# Patient Record
Sex: MALE | Race: WHITE | NOT HISPANIC OR LATINO
[De-identification: names, ages, dates, MRNs, and addresses within clinical notes are randomized per-mention and may not be internally consistent; named-entity substitution may affect disease eponyms.]

---

## 2017-01-24 PROBLEM — Z00.00 ENCOUNTER FOR PREVENTIVE HEALTH EXAMINATION: Status: ACTIVE | Noted: 2017-01-24

## 2017-02-02 ENCOUNTER — APPOINTMENT (OUTPATIENT)
Dept: OTOLARYNGOLOGY | Facility: CLINIC | Age: 67
End: 2017-02-02

## 2017-02-02 VITALS
HEART RATE: 83 BPM | HEIGHT: 72 IN | OXYGEN SATURATION: 95 % | WEIGHT: 195 LBS | DIASTOLIC BLOOD PRESSURE: 95 MMHG | BODY MASS INDEX: 26.41 KG/M2 | TEMPERATURE: 97.8 F | SYSTOLIC BLOOD PRESSURE: 145 MMHG

## 2017-02-02 DIAGNOSIS — H93.19 TINNITUS, UNSPECIFIED EAR: ICD-10-CM

## 2017-02-02 DIAGNOSIS — Z78.9 OTHER SPECIFIED HEALTH STATUS: ICD-10-CM

## 2017-02-02 DIAGNOSIS — F15.90 OTHER STIMULANT USE, UNSPECIFIED, UNCOMPLICATED: ICD-10-CM

## 2017-02-02 RX ORDER — IBUPROFEN 800 MG/1
TABLET, FILM COATED ORAL
Refills: 0 | Status: DISCONTINUED | COMMUNITY

## 2017-02-02 RX ORDER — ATORVASTATIN CALCIUM 10 MG/1
10 TABLET, FILM COATED ORAL
Refills: 0 | Status: DISCONTINUED | COMMUNITY

## 2017-02-16 ENCOUNTER — APPOINTMENT (OUTPATIENT)
Dept: OTOLARYNGOLOGY | Facility: CLINIC | Age: 67
End: 2017-02-16

## 2017-03-29 ENCOUNTER — APPOINTMENT (OUTPATIENT)
Dept: PHARMACY | Facility: CLINIC | Age: 67
End: 2017-03-29

## 2017-03-30 ENCOUNTER — APPOINTMENT (OUTPATIENT)
Dept: OTOLARYNGOLOGY | Facility: CLINIC | Age: 67
End: 2017-03-30

## 2017-03-30 DIAGNOSIS — G47.33 OBSTRUCTIVE SLEEP APNEA (ADULT) (PEDIATRIC): ICD-10-CM

## 2017-04-21 ENCOUNTER — MEDICATION RENEWAL (OUTPATIENT)
Age: 67
End: 2017-04-21

## 2017-12-15 ENCOUNTER — APPOINTMENT (OUTPATIENT)
Dept: OTOLARYNGOLOGY | Facility: CLINIC | Age: 67
End: 2017-12-15
Payer: COMMERCIAL

## 2017-12-15 VITALS
SYSTOLIC BLOOD PRESSURE: 151 MMHG | HEART RATE: 97 BPM | OXYGEN SATURATION: 96 % | TEMPERATURE: 98.2 F | DIASTOLIC BLOOD PRESSURE: 97 MMHG

## 2017-12-15 DIAGNOSIS — H93.12 TINNITUS, LEFT EAR: ICD-10-CM

## 2017-12-15 DIAGNOSIS — H90.11 CONDUCTIVE HEARING LOSS, UNILATERAL, RIGHT EAR, WITH UNRESTRICTED HEARING ON THE CONTRALATERAL SIDE: ICD-10-CM

## 2017-12-15 PROCEDURE — 92550 TYMPANOMETRY & REFLEX THRESH: CPT

## 2017-12-15 PROCEDURE — 92557 COMPREHENSIVE HEARING TEST: CPT

## 2017-12-15 PROCEDURE — 99214 OFFICE O/P EST MOD 30 MIN: CPT

## 2017-12-15 RX ORDER — CARBIDOPA AND LEVODOPA 25; 100 MG/1; MG/1
25-100 TABLET ORAL
Qty: 270 | Refills: 0 | Status: ACTIVE | COMMUNITY
Start: 2017-12-08

## 2017-12-15 RX ORDER — LOSARTAN POTASSIUM 25 MG/1
25 TABLET, FILM COATED ORAL
Qty: 90 | Refills: 0 | Status: ACTIVE | COMMUNITY
Start: 2017-10-23

## 2017-12-15 RX ORDER — SELEGILINE HYDROCHLORIDE 5 MG/1
5 CAPSULE ORAL
Qty: 60 | Refills: 0 | Status: ACTIVE | COMMUNITY
Start: 2017-11-20

## 2018-01-01 ENCOUNTER — EMERGENCY (EMERGENCY)
Facility: HOSPITAL | Age: 68
LOS: 1 days | Discharge: HOME | End: 2018-01-01
Admitting: EMERGENCY MEDICINE
Payer: COMMERCIAL

## 2018-01-01 ENCOUNTER — EMERGENCY (EMERGENCY)
Facility: HOSPITAL | Age: 68
LOS: 1 days | Discharge: HOME | End: 2018-01-01
Attending: EMERGENCY MEDICINE | Admitting: EMERGENCY MEDICINE
Payer: COMMERCIAL

## 2018-01-01 ENCOUNTER — APPOINTMENT (OUTPATIENT)
Dept: PHARMACY | Facility: CLINIC | Age: 68
End: 2018-01-01
Payer: SELF-PAY

## 2018-01-01 VITALS
SYSTOLIC BLOOD PRESSURE: 148 MMHG | OXYGEN SATURATION: 96 % | HEART RATE: 95 BPM | DIASTOLIC BLOOD PRESSURE: 89 MMHG | RESPIRATION RATE: 16 BRPM | TEMPERATURE: 98 F

## 2018-01-01 VITALS
DIASTOLIC BLOOD PRESSURE: 90 MMHG | TEMPERATURE: 98 F | HEART RATE: 91 BPM | SYSTOLIC BLOOD PRESSURE: 149 MMHG | OXYGEN SATURATION: 100 % | RESPIRATION RATE: 15 BRPM

## 2018-01-01 VITALS
OXYGEN SATURATION: 98 % | HEART RATE: 91 BPM | SYSTOLIC BLOOD PRESSURE: 155 MMHG | DIASTOLIC BLOOD PRESSURE: 97 MMHG | TEMPERATURE: 98 F | RESPIRATION RATE: 16 BRPM

## 2018-01-01 VITALS
TEMPERATURE: 98 F | DIASTOLIC BLOOD PRESSURE: 92 MMHG | OXYGEN SATURATION: 97 % | RESPIRATION RATE: 17 BRPM | SYSTOLIC BLOOD PRESSURE: 159 MMHG | HEART RATE: 96 BPM

## 2018-01-01 VITALS
RESPIRATION RATE: 18 BRPM | TEMPERATURE: 98 F | DIASTOLIC BLOOD PRESSURE: 86 MMHG | OXYGEN SATURATION: 97 % | HEART RATE: 79 BPM | SYSTOLIC BLOOD PRESSURE: 133 MMHG

## 2018-01-01 VITALS
OXYGEN SATURATION: 99 % | RESPIRATION RATE: 17 BRPM | DIASTOLIC BLOOD PRESSURE: 85 MMHG | HEART RATE: 94 BPM | SYSTOLIC BLOOD PRESSURE: 150 MMHG | TEMPERATURE: 97 F

## 2018-01-01 VITALS
OXYGEN SATURATION: 96 % | TEMPERATURE: 99 F | RESPIRATION RATE: 18 BRPM | DIASTOLIC BLOOD PRESSURE: 93 MMHG | HEART RATE: 89 BPM | SYSTOLIC BLOOD PRESSURE: 148 MMHG

## 2018-01-01 VITALS
RESPIRATION RATE: 16 BRPM | HEART RATE: 98 BPM | SYSTOLIC BLOOD PRESSURE: 166 MMHG | TEMPERATURE: 98 F | OXYGEN SATURATION: 98 % | DIASTOLIC BLOOD PRESSURE: 103 MMHG

## 2018-01-01 DIAGNOSIS — I10 ESSENTIAL (PRIMARY) HYPERTENSION: ICD-10-CM

## 2018-01-01 DIAGNOSIS — S06.6X0A TRAUMATIC SUBARACHNOID HEMORRHAGE WITHOUT LOSS OF CONSCIOUSNESS, INITIAL ENCOUNTER: ICD-10-CM

## 2018-01-01 DIAGNOSIS — S01.81XA LACERATION WITHOUT FOREIGN BODY OF OTHER PART OF HEAD, INITIAL ENCOUNTER: ICD-10-CM
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
## 2018-01-01 DIAGNOSIS — R33.9 RETENTION OF URINE, UNSPECIFIED: ICD-10-CM

## 2018-01-01 DIAGNOSIS — Z79.899 OTHER LONG TERM (CURRENT) DRUG THERAPY: ICD-10-CM

## 2018-01-01 DIAGNOSIS — R29.6 REPEATED FALLS: ICD-10-CM

## 2018-01-01 DIAGNOSIS — R41.82 ALTERED MENTAL STATUS, UNSPECIFIED: ICD-10-CM

## 2018-01-01 DIAGNOSIS — E78.5 HYPERLIPIDEMIA, UNSPECIFIED: ICD-10-CM
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
## 2018-01-01 LAB
ALBUMIN SERPL ELPH-MCNC: 2.6 G/DL — LOW (ref 3.4–5)
ALBUMIN SERPL ELPH-MCNC: 3.2 G/DL — LOW (ref 3.4–5)
ALP SERPL-CCNC: 81 U/L — SIGNIFICANT CHANGE UP (ref 40–120)
ALP SERPL-CCNC: 93 U/L — SIGNIFICANT CHANGE UP (ref 40–120)
ALT FLD-CCNC: 10 U/L — LOW (ref 12–42)
ALT FLD-CCNC: 32 U/L — SIGNIFICANT CHANGE UP (ref 12–42)
ANION GAP SERPL CALC-SCNC: 13 MMOL/L — SIGNIFICANT CHANGE UP (ref 5–17)
ANION GAP SERPL CALC-SCNC: 8 MMOL/L — LOW (ref 9–16)
ANION GAP SERPL CALC-SCNC: 8 MMOL/L — LOW (ref 9–16)
APPEARANCE UR: CLEAR — SIGNIFICANT CHANGE UP
APTT BLD: 30.4 SEC — SIGNIFICANT CHANGE UP (ref 27.5–36.3)
AST SERPL-CCNC: 30 U/L — SIGNIFICANT CHANGE UP (ref 15–37)
AST SERPL-CCNC: 35 U/L — SIGNIFICANT CHANGE UP (ref 15–37)
BASOPHILS NFR BLD AUTO: 0.5 % — SIGNIFICANT CHANGE UP (ref 0–2)
BASOPHILS NFR BLD AUTO: 0.5 % — SIGNIFICANT CHANGE UP (ref 0–2)
BILIRUB SERPL-MCNC: 0.3 MG/DL — SIGNIFICANT CHANGE UP (ref 0.2–1.2)
BILIRUB SERPL-MCNC: 0.4 MG/DL — SIGNIFICANT CHANGE UP (ref 0.2–1.2)
BILIRUB UR-MCNC: NEGATIVE — SIGNIFICANT CHANGE UP
BUN SERPL-MCNC: 24 MG/DL — HIGH (ref 7–23)
BUN SERPL-MCNC: 25 MG/DL — HIGH (ref 7–23)
BUN SERPL-MCNC: 35 MG/DL — HIGH (ref 7–23)
CALCIUM SERPL-MCNC: 8.6 MG/DL — SIGNIFICANT CHANGE UP (ref 8.5–10.5)
CALCIUM SERPL-MCNC: 8.9 MG/DL — SIGNIFICANT CHANGE UP (ref 8.5–10.5)
CALCIUM SERPL-MCNC: 9 MG/DL — SIGNIFICANT CHANGE UP (ref 8.4–10.5)
CHLORIDE SERPL-SCNC: 101 MMOL/L — SIGNIFICANT CHANGE UP (ref 96–108)
CHLORIDE SERPL-SCNC: 103 MMOL/L — SIGNIFICANT CHANGE UP (ref 96–108)
CHLORIDE SERPL-SCNC: 104 MMOL/L — SIGNIFICANT CHANGE UP (ref 96–108)
CO2 SERPL-SCNC: 26 MMOL/L — SIGNIFICANT CHANGE UP (ref 22–31)
CO2 SERPL-SCNC: 27 MMOL/L — SIGNIFICANT CHANGE UP (ref 22–31)
CO2 SERPL-SCNC: 27 MMOL/L — SIGNIFICANT CHANGE UP (ref 22–31)
COLOR SPEC: YELLOW — SIGNIFICANT CHANGE UP
CREAT SERPL-MCNC: 0.8 MG/DL — SIGNIFICANT CHANGE UP (ref 0.5–1.3)
CREAT SERPL-MCNC: 0.82 MG/DL — SIGNIFICANT CHANGE UP (ref 0.5–1.3)
CREAT SERPL-MCNC: 0.98 MG/DL — SIGNIFICANT CHANGE UP (ref 0.5–1.3)
DIFF PNL FLD: ABNORMAL
EOSINOPHIL NFR BLD AUTO: 0.7 % — SIGNIFICANT CHANGE UP (ref 0–6)
EOSINOPHIL NFR BLD AUTO: 1.8 % — SIGNIFICANT CHANGE UP (ref 0–6)
EXTRA BLUE TOP TUBE: SIGNIFICANT CHANGE UP
GLUCOSE SERPL-MCNC: 107 MG/DL — HIGH (ref 70–99)
GLUCOSE SERPL-MCNC: 109 MG/DL — HIGH (ref 70–99)
GLUCOSE SERPL-MCNC: 137 MG/DL — HIGH (ref 70–99)
GLUCOSE UR QL: NEGATIVE — SIGNIFICANT CHANGE UP
HCT VFR BLD CALC: 37.1 % — LOW (ref 39–50)
HCT VFR BLD CALC: 39.4 % — SIGNIFICANT CHANGE UP (ref 39–50)
HCT VFR BLD CALC: 39.4 % — SIGNIFICANT CHANGE UP (ref 39–50)
HGB BLD-MCNC: 12.2 G/DL — LOW (ref 13–17)
HGB BLD-MCNC: 12.8 G/DL — LOW (ref 13–17)
HGB BLD-MCNC: 13.1 G/DL — SIGNIFICANT CHANGE UP (ref 13–17)
IMM GRANULOCYTES NFR BLD AUTO: 1.5 % — SIGNIFICANT CHANGE UP (ref 0–1.5)
INR BLD: 1.11 — SIGNIFICANT CHANGE UP (ref 0.88–1.16)
KETONES UR-MCNC: NEGATIVE — SIGNIFICANT CHANGE UP
LEUKOCYTE ESTERASE UR-ACNC: ABNORMAL
LYMPHOCYTES # BLD AUTO: 13.6 % — SIGNIFICANT CHANGE UP (ref 13–44)
LYMPHOCYTES # BLD AUTO: 19.6 % — SIGNIFICANT CHANGE UP (ref 13–44)
MCHC RBC-ENTMCNC: 30.3 PG — SIGNIFICANT CHANGE UP (ref 27–34)
MCHC RBC-ENTMCNC: 30.5 PG — SIGNIFICANT CHANGE UP (ref 27–34)
MCHC RBC-ENTMCNC: 31 PG — SIGNIFICANT CHANGE UP (ref 27–34)
MCHC RBC-ENTMCNC: 32.5 G/DL — SIGNIFICANT CHANGE UP (ref 32–36)
MCHC RBC-ENTMCNC: 32.9 G/DL — SIGNIFICANT CHANGE UP (ref 32–36)
MCHC RBC-ENTMCNC: 33.2 G/DL — SIGNIFICANT CHANGE UP (ref 32–36)
MCV RBC AUTO: 91.6 FL — SIGNIFICANT CHANGE UP (ref 80–100)
MCV RBC AUTO: 93.1 FL — SIGNIFICANT CHANGE UP (ref 80–100)
MCV RBC AUTO: 94.4 FL — SIGNIFICANT CHANGE UP (ref 80–100)
MONOCYTES NFR BLD AUTO: 7.6 % — SIGNIFICANT CHANGE UP (ref 2–14)
MONOCYTES NFR BLD AUTO: 7.6 % — SIGNIFICANT CHANGE UP (ref 2–14)
NEUTROPHILS NFR BLD AUTO: 70.5 % — SIGNIFICANT CHANGE UP (ref 43–77)
NEUTROPHILS NFR BLD AUTO: 76.1 % — SIGNIFICANT CHANGE UP (ref 43–77)
NITRITE UR-MCNC: NEGATIVE — SIGNIFICANT CHANGE UP
PH UR: 6 — SIGNIFICANT CHANGE UP (ref 5–8)
PLATELET # BLD AUTO: 306 K/UL — SIGNIFICANT CHANGE UP (ref 150–400)
PLATELET # BLD AUTO: 336 K/UL — SIGNIFICANT CHANGE UP (ref 150–400)
PLATELET # BLD AUTO: 365 K/UL — SIGNIFICANT CHANGE UP (ref 150–400)
POTASSIUM SERPL-MCNC: 3.7 MMOL/L — SIGNIFICANT CHANGE UP (ref 3.5–5.3)
POTASSIUM SERPL-MCNC: 3.8 MMOL/L — SIGNIFICANT CHANGE UP (ref 3.5–5.3)
POTASSIUM SERPL-MCNC: 4 MMOL/L — SIGNIFICANT CHANGE UP (ref 3.5–5.3)
POTASSIUM SERPL-SCNC: 3.7 MMOL/L — SIGNIFICANT CHANGE UP (ref 3.5–5.3)
POTASSIUM SERPL-SCNC: 3.8 MMOL/L — SIGNIFICANT CHANGE UP (ref 3.5–5.3)
POTASSIUM SERPL-SCNC: 4 MMOL/L — SIGNIFICANT CHANGE UP (ref 3.5–5.3)
PROT SERPL-MCNC: 6.5 G/DL — SIGNIFICANT CHANGE UP (ref 6.4–8.2)
PROT SERPL-MCNC: 6.9 G/DL — SIGNIFICANT CHANGE UP (ref 6.4–8.2)
PROT UR-MCNC: ABNORMAL MG/DL
PROTHROM AB SERPL-ACNC: 12.4 SEC — SIGNIFICANT CHANGE UP (ref 10–12.9)
RBC # BLD: 3.93 M/UL — LOW (ref 4.2–5.8)
RBC # BLD: 4.23 M/UL — SIGNIFICANT CHANGE UP (ref 4.2–5.8)
RBC # BLD: 4.3 M/UL — SIGNIFICANT CHANGE UP (ref 4.2–5.8)
RBC # FLD: 12.1 % — SIGNIFICANT CHANGE UP (ref 10.3–14.5)
RBC # FLD: 13.4 % — SIGNIFICANT CHANGE UP (ref 10.3–14.5)
RBC # FLD: 13.7 % — SIGNIFICANT CHANGE UP (ref 10.3–16.9)
SODIUM SERPL-SCNC: 137 MMOL/L — SIGNIFICANT CHANGE UP (ref 132–145)
SODIUM SERPL-SCNC: 139 MMOL/L — SIGNIFICANT CHANGE UP (ref 132–145)
SODIUM SERPL-SCNC: 141 MMOL/L — SIGNIFICANT CHANGE UP (ref 135–145)
SP GR SPEC: 1.01 — SIGNIFICANT CHANGE UP (ref 1–1.03)
UROBILINOGEN FLD QL: 0.2 E.U./DL — SIGNIFICANT CHANGE UP
WBC # BLD: 11.1 K/UL — HIGH (ref 3.8–10.5)
WBC # BLD: 8.2 K/UL — SIGNIFICANT CHANGE UP (ref 3.8–10.5)
WBC # BLD: 9.9 K/UL — SIGNIFICANT CHANGE UP (ref 3.8–10.5)
WBC # FLD AUTO: 11.1 K/UL — HIGH (ref 3.8–10.5)
WBC # FLD AUTO: 8.2 K/UL — SIGNIFICANT CHANGE UP (ref 3.8–10.5)
WBC # FLD AUTO: 9.9 K/UL — SIGNIFICANT CHANGE UP (ref 3.8–10.5)

## 2018-01-01 PROCEDURE — 70450 CT HEAD/BRAIN W/O DYE: CPT

## 2018-01-01 PROCEDURE — V5299A: CUSTOM | Mod: NC

## 2018-01-01 PROCEDURE — 93005 ELECTROCARDIOGRAM TRACING: CPT

## 2018-01-01 PROCEDURE — 99284 EMERGENCY DEPT VISIT MOD MDM: CPT | Mod: 25

## 2018-01-01 PROCEDURE — 93010 ELECTROCARDIOGRAM REPORT: CPT

## 2018-01-01 PROCEDURE — 36415 COLL VENOUS BLD VENIPUNCTURE: CPT

## 2018-01-01 PROCEDURE — 72125 CT NECK SPINE W/O DYE: CPT | Mod: 26

## 2018-01-01 PROCEDURE — V5299D: CUSTOM

## 2018-01-01 PROCEDURE — 70450 CT HEAD/BRAIN W/O DYE: CPT | Mod: 26

## 2018-01-01 PROCEDURE — 97161 PT EVAL LOW COMPLEX 20 MIN: CPT

## 2018-01-01 PROCEDURE — 93010 ELECTROCARDIOGRAM REPORT: CPT | Mod: 59

## 2018-01-01 PROCEDURE — 73523 X-RAY EXAM HIPS BI 5/> VIEWS: CPT

## 2018-01-01 PROCEDURE — 99284 EMERGENCY DEPT VISIT MOD MDM: CPT

## 2018-01-01 PROCEDURE — 80048 BASIC METABOLIC PNL TOTAL CA: CPT

## 2018-01-01 PROCEDURE — 71045 X-RAY EXAM CHEST 1 VIEW: CPT | Mod: 26

## 2018-01-01 PROCEDURE — 99285 EMERGENCY DEPT VISIT HI MDM: CPT | Mod: 25

## 2018-01-01 PROCEDURE — 12011 RPR F/E/E/N/L/M 2.5 CM/<: CPT

## 2018-01-01 PROCEDURE — 85025 COMPLETE CBC W/AUTO DIFF WBC: CPT

## 2018-01-01 PROCEDURE — 73523 X-RAY EXAM HIPS BI 5/> VIEWS: CPT | Mod: 26

## 2018-01-01 RX ORDER — CARBIDOPA AND LEVODOPA 25; 100 MG/1; MG/1
1 TABLET ORAL ONCE
Qty: 0 | Refills: 0 | Status: COMPLETED | OUTPATIENT
Start: 2018-01-01 | End: 2018-01-01

## 2018-01-01 RX ORDER — BACITRACIN ZINC 500 UNIT/G
1 OINTMENT IN PACKET (EA) TOPICAL ONCE
Qty: 0 | Refills: 0 | Status: COMPLETED | OUTPATIENT
Start: 2018-01-01 | End: 2018-01-01

## 2018-01-01 RX ADMIN — CARBIDOPA AND LEVODOPA 1 TABLET(S): 25; 100 TABLET ORAL at 23:59

## 2018-01-01 RX ADMIN — Medication 1 APPLICATION(S): at 03:26

## 2018-03-06 ENCOUNTER — APPOINTMENT (OUTPATIENT)
Dept: PHARMACY | Facility: CLINIC | Age: 68
End: 2018-03-06
Payer: SELF-PAY

## 2018-03-06 PROCEDURE — V5010 ASSESSMENT FOR HEARING AID: CPT

## 2018-03-19 ENCOUNTER — APPOINTMENT (OUTPATIENT)
Dept: PHARMACY | Facility: CLINIC | Age: 68
End: 2018-03-19
Payer: SELF-PAY

## 2018-03-19 PROCEDURE — V5261E: CUSTOM

## 2018-03-28 ENCOUNTER — APPOINTMENT (OUTPATIENT)
Dept: PHARMACY | Facility: CLINIC | Age: 68
End: 2018-03-28
Payer: SELF-PAY

## 2018-03-28 PROCEDURE — V5299A: CUSTOM | Mod: NC

## 2018-09-18 NOTE — ED PROVIDER NOTE - NEUROLOGICAL, MLM
Alert and oriented, no focal deficits, no motor or sensory deficits.  Intermittent RUE pill rolling movements.

## 2018-09-18 NOTE — ED ADULT NURSE NOTE - CHIEF COMPLAINT QUOTE
pt had archer placed yesterday by [facility]  urology. pt had renal sono at 1pm today, had bag emptied there and bag has not drained since. now with distention and pain

## 2018-09-18 NOTE — ED ADULT TRIAGE NOTE - CHIEF COMPLAINT QUOTE
pt had archer placed yesterday by [facility]  urology. pt had renal sono at 1pm today, had bag emptied there and bag has not drained since. now with distention and pain

## 2018-09-18 NOTE — ED ADULT NURSE NOTE - OBJECTIVE STATEMENT
Pt is a 68y male complaining of urinary catheter complications. Pt had catheter placed yesterday morning by urology yesterday. As per patients wife pt went to for renal ultrasound today and drainage bag was emptied there at about 1pm. Drainage bag has not filled since that time. Pts bladder is distended. Pt denies pain at this time. Will  continue to monitor.

## 2018-09-18 NOTE — ED ADULT NURSE REASSESSMENT NOTE - NS ED NURSE REASSESS COMMENT FT1
Pt urinary catheter flushed with 20cc of sterile water. Immediate urine output of 600cc, archer catheter still draining. Stomach no longer distended. Will continue to monitor.

## 2018-09-18 NOTE — ED PROVIDER NOTE - CONSTITUTIONAL, MLM
normal... flat affect, well nourished, awake, alert, oriented to person, place, time/situation and in no apparent distress.

## 2018-09-18 NOTE — ED ADULT NURSE NOTE - NSIMPLEMENTINTERV_GEN_ALL_ED
Implemented All Fall Risk Interventions:  [place] to call system. Call bell, personal items and telephone within reach. Instruct patient to call for assistance. Room bathroom lighting operational. Non-slip footwear when patient is off stretcher. Physically safe environment: no spills, clutter or unnecessary equipment. Stretcher in lowest position, wheels locked, appropriate side rails in place. Provide visual cue, wrist band, yellow gown, etc. Monitor gait and stability. Monitor for mental status changes and reorient to person, place, and time. Review medications for side effects contributing to fall risk. Reinforce activity limits and safety measures with patient and family.

## 2018-09-18 NOTE — ED PROVIDER NOTE - PROGRESS NOTE DETAILS
labs and urine reviewed with patient and sig other.  Garcia flushed easily with large output of concentrated urine.  Distention improved.  Has scheduled appt with their urologist next week. Retention could be from progressive PSP vs medication adverse affect.  Conservative management discussed with the patient in detail.  Close urology follow up encouraged.  Strict ED return instructions discussed in detail and patient given the opportunity to ask any questions about their discharge diagnosis and instructions

## 2018-09-18 NOTE — ED PROVIDER NOTE - MEDICAL DECISION MAKING DETAILS
Urinary retention since yesterday with archer catheter in place.  NO output x 7 hours.  Mild distention but denies pain.  As per partner due to his PSP his ability to sense pain is diminished.  Hx of prostatectomy.  Bedside US with full bladder with archer catheter in place.  Likely obstructed.  Will try to flush but low threshold to replace.  Labs, urine ordered.

## 2018-09-18 NOTE — ED PROVIDER NOTE - OBJECTIVE STATEMENT
HX of PSP (parkinsonian like syndrome) presents with no output from his urinary catheter since 1 pm.  Had archer catheter placed at his urologists office yesterday due to obstructive symptoms.  As per partner abdomen was distended and he was unable to make urine.  HX of prostatectomy due to cancer about 7 years ago.  On sinimet, trazadone, statin.  Initially had some bloody urine but has cleared over the past 24 hours.

## 2018-10-24 NOTE — ED ADULT TRIAGE NOTE - CHIEF COMPLAINT QUOTE
Patient reports tripped and fell on [address]. Denies head injury. Hx. Parkinson's. Dirt from ground noted on patient's clothing. Fall precautions initiated on arrival. Wife in route to ED. No obvious injuries noted.

## 2018-10-24 NOTE — ED ADULT NURSE NOTE - NSIMPLEMENTINTERV_GEN_ALL_ED
Implemented All Universal Safety Interventions:  [place] to call system. Call bell, personal items and telephone within reach. Instruct patient to call for assistance. Room bathroom lighting operational. Non-slip footwear when patient is off stretcher. Physically safe environment: no spills, clutter or unnecessary equipment. Stretcher in lowest position, wheels locked, appropriate side rails in place.

## 2018-10-24 NOTE — ED PROVIDER NOTE - OBJECTIVE STATEMENT
67 y/o M   PMHx: Progressive supranuclear palsy    Pt presents to ED for mechanical trip and fall while walking on sidewalk. Pt is unsure if he hit his head but denies LOC. Pt has frequent falls 2/2 his PSP- which he states causes him to have shuffling gait and frequent loss of balance. Denies pain, headache, weakness, numbness, visual changes, gait/balance changes

## 2018-10-24 NOTE — ED PROVIDER NOTE - PHYSICAL EXAMINATION
VITAL SIGNS: I have reviewed nursing notes and confirm.  CONSTITUTIONAL: Well-developed; well-nourished; in no acute distress.  SKIN: Skin is warm and dry, no acute rash.  HEAD: Normocephalic; atraumatic.  EYES: PERRL, EOM intact; conjunctiva and sclera clear.  ENT: No nasal discharge; airway clear.  NECK: Supple; non tender.  CARD: S1, S2 normal; no murmurs, gallops, or rubs. Regular rate and rhythm.  RESP: No wheezes, rales or rhonchi.  ABD: Normal bowel sounds; soft; non-distended; non-tender; no hepatosplenomegaly.  EXT: Normal ROM. No clubbing, cyanosis or edema.  NEURO: Alert, oriented. Grossly unremarkable. resting tremor (baseline)  PSYCH: Cooperative, appropriate.

## 2018-10-25 PROBLEM — G23.1: Chronic | Status: ACTIVE | Noted: 2018-01-01

## 2018-12-10 NOTE — ED PROVIDER NOTE - PHYSICAL EXAMINATION
Constitutional: Well appearing, well nourished, awake, alert, oriented to person, place, time/situation and in no apparent distress.  Head atraumatic, normocephalic  ENMT: Airway patent. Normal MM  Eyes: Clear bilaterally. PERRL. EOMI  Cardiac: Normal rate, regular rhythm.  Heart sounds S1, S2.  No murmurs, rubs or gallops.  Respiratory: Breaths sounds equal and clear b/l. No increased WOB, tachypnea, hypoxia, or accessory mm use. Pt speaks in full sentences.   Gastrointestinal: Abd soft, NT, ND, NABS. No guarding, rebound, or rigidity. No pulsatile abdominal masses. No organomegaly appreciated. No CVAT   Musculoskeletal: Range of motion is not limited. No midline spinal ttp throughout the spine. No midline cervical pain w/ ROM. FROM in all joints x 4 ext w/ dec ROM, pain, or signs of trauma.   Neuro: Alert & Oriented x 3. CN II-XII intact. No facial droop. Clear speech. SHAW w/ 5/5 strength x 4 ext. Normal sensation. No pronator drift. Baseline resting tremor L hand  Skin: Skin normal color for race, warm, dry and intact. No evidence of rash. Abrasions noted to RLE, abdominal wall.   Psych: Alert and oriented to person, place, time/situation. normal mood and affect. no apparent risk to self or others.

## 2018-12-10 NOTE — ED ADULT NURSE NOTE - OBJECTIVE STATEMENT
Pt BIBA to ED s/p fall. 911 called by witness. Pt denies any LOC, no head trauma, denies pain, chest pain/ SOB, no fever/chills, N/V/D. No slurred speech, facial droop, or weakness noted. Right sided tremors noted, Hx of parkinson's.

## 2018-12-10 NOTE — ED ADULT TRIAGE NOTE - CHIEF COMPLAINT QUOTE
Patient BIBA for AMS, per EMS patient had a witnessed fall, 911 called by bystander who states patient did not sustain head trauma.  On arrival to ED patient AAOx3, slow to respond, unknown baseline.  Patient with right UE tremor, PMHx of Parkinsons.  Patient denies any CP, SOB, dizziness, pain, N/V/D, fevers, chills or any other complaints at this time.  No slurred speech or facial droop noted.   via EMS.

## 2018-12-10 NOTE — ED PROVIDER NOTE - PROGRESS NOTE DETAILS
D/w pt's wife Evonne Serna [phone]: Pt has Progessive Supranuclear Palsy - frequent falls, frequently closes eyes, does not protect arms w/ falls. Pt is not supposed to travel on his own. She had arranged someone to pick him up and take him somewhere in a taxi, but did not wait, and she was looking for him. According to my description of pt, pt is at baseline mental status. Pt's physicians at [place]. W/u neg. PT seen by PT. Pt functional. Recommends outpt PT specializing in neurologic d/o for balance. Wife will come and  pt. Will d/c

## 2018-12-10 NOTE — PHYSICAL THERAPY INITIAL EVALUATION ADULT - MODALITIES TREATMENT COMMENTS
Pt presents with functional independence, however was noted to have delayed reaction times and increased apparent tone/rigidity as he turns and moves nearly en bloc. This is likely what was also observed as he ascends stairs with extensive plantarflexion, with poor postural stability and rigid compensations. Pt may benefit from skilled outpatient services that focus on neurologic disorders to provide fall prevention strategies and improve neuromuscular and motor control.

## 2018-12-10 NOTE — ED ADULT NURSE NOTE - CHPI ED NUR SYMPTOMS NEG
no loss of consciousness/no tingling/no confusion/no deformity/no vomiting/no bleeding/no fever/no numbness/no weakness no confusion/no tingling/no numbness/no deformity/no fever/no loss of consciousness/no vomiting

## 2018-12-10 NOTE — ED ADULT NURSE NOTE - NSIMPLEMENTINTERV_GEN_ALL_ED
Implemented All Fall Risk Interventions:  [place] to call system. Call bell, personal items and telephone within reach. Instruct patient to call for assistance. Room bathroom lighting operational. Non-slip footwear when patient is off stretcher. Physically safe environment: no spills, clutter or unnecessary equipment. Stretcher in lowest position, wheels locked, appropriate side rails in place. Provide visual cue, wrist band, yellow gown, etc. Monitor gait and stability. Monitor for mental status changes and reorient to person, place, and time. Review medications for side effects contributing to fall risk. Reinforce activity limits and safety measures with patient and family.

## 2018-12-10 NOTE — ED PROVIDER NOTE - NSFOLLOWUPINSTRUCTIONS_ED_ALL_ED_FT
You were evaluated in the ED after a fall. Your blood work, CT of the brain, and xrays of hips and pelvis did not reveal any acute abnormalities. You were evaluated by Physical Therapy and were felt safe to go home with good functional status. It is recommended you see a PT whom specializes in neurologic diseases for better balance. Please follow up with your doctors at [facility]

## 2018-12-10 NOTE — ED PROVIDER NOTE - DIAGNOSTIC INTERPRETATION
Xray hip / pelvis INTERPRETATION:  no acute fracture; no soft tissue swelling noted; normal bony alignment.

## 2018-12-10 NOTE — ED PROVIDER NOTE - MEDICAL DECISION MAKING DETAILS
Pt presents s/p witnessed fall. Pt w/ no apparent injury other than superficial injuries. Will d/w wife, check labs, EKG, CT brain. No apparent hip pain w/ full ROM, however pt was unable to get up on scene, likely 2/2 to chronic neurologic dysfunction, but will XR hips. PT eval. Dispo pending w/u and clinical status

## 2018-12-10 NOTE — ED PROVIDER NOTE - OBJECTIVE STATEMENT
Pt w/ PMHx progressive supranuclear palsy (baseline forgetful, frequently closes eyes, frequent falls a/p wife Evonne [phone]) BIBA s/p witnessed fall. Bystanders in the field reported pt was observed to fall. Pt was observed not having hit her head. No LOC. Pt was unable to get up on his own and EMS was called.  in the field. Pt reports he slipped and fell. Pt denies hitting his head. Pt denies pain.

## 2018-12-10 NOTE — PHYSICAL THERAPY INITIAL EVALUATION ADULT - ADDITIONAL COMMENTS
Pt lives in elevator building, no prior use of DME, denies hx of falls (despite alternative report on ED notes).

## 2018-12-11 NOTE — ED PROVIDER NOTE - MEDICAL DECISION MAKING DETAILS
ct brain and c spine, patient had negative head ct at [facility] yesterday. neurologically intact.

## 2018-12-11 NOTE — ED ADULT TRIAGE NOTE - CHIEF COMPLAINT QUOTE
pt hx Parkinson's had a fall outside apartment; trip and fall forward; hit head on scaffolding; was with partner who witnessed accident; denies LOC; laceration to left side of head; in c-collar  from EMS

## 2018-12-11 NOTE — ED ADULT NURSE NOTE - NSIMPLEMENTINTERV_GEN_ALL_ED
Implemented All Fall with Harm Risk Interventions:  [place] to call system. Call bell, personal items and telephone within reach. Instruct patient to call for assistance. Room bathroom lighting operational. Non-slip footwear when patient is off stretcher. Physically safe environment: no spills, clutter or unnecessary equipment. Stretcher in lowest position, wheels locked, appropriate side rails in place. Provide visual cue, wrist band, yellow gown, etc. Monitor gait and stability. Monitor for mental status changes and reorient to person, place, and time. Review medications for side effects contributing to fall risk. Reinforce activity limits and safety measures with patient and family. Provide visual clues: red socks.

## 2018-12-11 NOTE — ED ADULT NURSE NOTE - OBJECTIVE STATEMENT
Pt is a 68y male complaining of head injury.  As per pts wife pt was Pt is a 68y male complaining of head injury.  As per pts wife pt has a history of PSP and is unable to open his eyes all the way. Pt was walking inbetween two card when he tripped falling forward hitting his head against the sidewalk. Pt then turned around an hit the back of his head against the cement. As per pts wife no loc. Wife states that pt is at baseline status. Pt has large hematoma to the occipital lobe on the left side with scant bleeding as well as laceration above left eyebrow. Pt denies  pain at this time. PT has small abrasion to left knee. Pt denies chest pain, sob.

## 2018-12-11 NOTE — ED PROVIDER NOTE - OBJECTIVE STATEMENT
Patient with hx of HTN, HLD, and progressive supranuclear palsy, managed with sinamet, on losartan, statin, and trazodone for sleep presents s/p fall. Patient was seen less than 24 hours ago at [facility] after a trip and fall. patient went to ED, and was evaluated, had normal labs and CT brain negative. today while out running errands and returning home, tripped on scaffolding, and fell forward striking L side of face. per spouse, no loc. per spouse, has a laceration to L side of forehead. no seizure witnessed, patient with baseline mental status. per patient, has no pain to head or neck. patient denies cp, sob or palpitations. per spouse, hx of frequent falls with no incontinence, or recent hospitalizations.

## 2018-12-11 NOTE — ED PROVIDER NOTE - PROGRESS NOTE DETAILS
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 spoke to their colleague and myself dr lorenzo higginbotham. agrees with transfer to Neuro ICU at [facility]. spoke to transfer center, with neuro ICU fellow, and accepted to Dr Giron's service, on the graduate service. patient is clinically stable, with no worsening neuro findings. spoke to fellow dr tobin in neuro icu, given report, and nurse to give report for transfer.

## 2018-12-11 NOTE — ED PROVIDER NOTE - CRANIAL NERVE AND PUPILLARY EXAM
cranial nerves 2-12 intact/gag reflex intact/tongue is midline/inability to lower eyes, baseline for patient with bilateral ptosis

## 2020-07-31 NOTE — ED PROVIDER NOTE - CARE PLAN
Principal Discharge DX:	Fall, initial encounter Skin Substitute Text: The defect edges were debeveled with a #15 scalpel blade.  Given the location of the defect, shape of the defect and the proximity to free margins a skin substitute graft was deemed most appropriate.  The graft material was trimmed to fit the size of the defect. The graft was then placed in the primary defect and oriented appropriately.

## 2023-07-21 NOTE — ED PROVIDER NOTE - MUSCULOSKELETAL [-], MLM
Outpatient Instructions for Monitored Anesthesia Care (MAC)    1. You will be released from the hospital in the care of a responsible adult who should remain with you for at least 6 hours.    2. You are at an increased risk for falls following anesthesia. Use care when changing from a lying to a sitting position. Use your assistive devices ( example: cane, walker or family member).    3. You must NOT drive a car, climb high places such as a ladder, or operate equipment such as electric knives,stoves etc...for at least 12 hours. If you are dizzy for longer than 24 hours, notify your doctor.    4. DO NOT drink any alcoholic beverages for at least 24 hours. Anesthesia may impair your judgement.    5. If you smoke, do not smoke alone due to increased risk of burns/fires.    6. DO NOT undertake any legally binding commitment for at least 24 hours. Anesthesia may impair your judgement.   
no calf pain/no back pain/no joint pain/no neck pain

## 2023-08-07 NOTE — ED PROVIDER NOTE - GASTROINTESTINAL NEGATIVE STATEMENT, MLM
-- DO NOT REPLY / DO NOT REPLY ALL --  -- Message is from Engagement Center Operations (ECO) --    General Patient Message: Patient retuning call from Dr Roger Robertson office to reschedule 8/9  appointment please call patient back to reschedule  asap will needs medication renewal. Patient available afternoons     Caller Information       Type Contact Phone/Fax    08/07/2023 02:37 PM CDT Phone (Incoming) Poonam Coffman (Self) [phone] (M)        Alternative phone number: none     Can a detailed message be left? Yes    Message Turnaround: [place]:    Refer to site's KB page for routing instructions    Please give this turnaround time to the caller:   \"You can expect to receive a response 2-3 business days after your provider's clinical team reviews the message\"               no abdominal pain, no bloating, no constipation, no diarrhea, no nausea and no vomiting.

## 2023-11-23 NOTE — ED ADULT NURSE NOTE - PMH
Pt admitted to room 7221 from PACU. Pt assissted into bed. Pt orientated to surroundings and call light, call light in pts reach. 4 eyes 4 skin with [place] no open areas noted no bruseing noted. Plan of care review with pt and wife.   
RN completed RN med rec per verbal order by Dr. iJmenez. Pt discharged accompanied by spouse. Discharge education completed. All questions answered at this time.   
PSP (progressive supranuclear palsy)

## 2024-02-19 NOTE — ED PROVIDER NOTE - EYES, MLM
Initiate Treatment: Wash face with anti dandruff shampoo Detail Level: Detailed Clear bilaterally, pupils equal, round and reactive to light.

## 2024-10-05 NOTE — ED PROCEDURE NOTE - CPROC ED TIME OUT STATEMENT1
“Patient's name, , procedure and correct site were confirmed during the [place] Timeout.” Flora
